# Patient Record
Sex: FEMALE | Race: OTHER | HISPANIC OR LATINO | URBAN - METROPOLITAN AREA
[De-identification: names, ages, dates, MRNs, and addresses within clinical notes are randomized per-mention and may not be internally consistent; named-entity substitution may affect disease eponyms.]

---

## 2021-11-02 ENCOUNTER — OFFICE (OUTPATIENT)
Dept: URBAN - METROPOLITAN AREA CLINIC 34 | Facility: CLINIC | Age: 61
End: 2021-11-02
Payer: MEDICAID

## 2021-11-02 VITALS
HEART RATE: 60 BPM | WEIGHT: 138 LBS | TEMPERATURE: 97.9 F | DIASTOLIC BLOOD PRESSURE: 82 MMHG | HEIGHT: 60 IN | SYSTOLIC BLOOD PRESSURE: 146 MMHG

## 2021-11-02 DIAGNOSIS — R14.0 ABDOMINAL DISTENSION (GASEOUS): ICD-10-CM

## 2021-11-02 DIAGNOSIS — Z12.11 ENCOUNTER FOR SCREENING FOR MALIGNANT NEOPLASM OF COLON: ICD-10-CM

## 2021-11-02 DIAGNOSIS — R10.13 EPIGASTRIC PAIN: ICD-10-CM

## 2021-11-02 DIAGNOSIS — R13.19 OTHER DYSPHAGIA: ICD-10-CM

## 2021-11-02 DIAGNOSIS — R12 HEARTBURN: ICD-10-CM

## 2021-11-02 PROCEDURE — 99204 OFFICE O/P NEW MOD 45 MIN: CPT

## 2021-11-02 RX ORDER — OMEPRAZOLE 40 MG/1
CAPSULE, DELAYED RELEASE ORAL
Qty: 30 | Refills: 0 | Status: ACTIVE

## 2021-11-02 NOTE — SERVICEHPINOTES
CHRIS ALANIZ   is a   61   year old    female who is being seen in consultation at the request of    for hiatal hernia. Former smoker (25 pack/years, quit 5 years ago) with emphysema. She had a chest CT in Sept for lung cancer screening which incidentally found a moderate hiatal hernia. br She has taken omeprazole 20mg daily for years but states acid reflux has gotten worse over the past couple months so increased to 40mg recently. Takes it after lunch. She reports dysphagia to solids jun if she eats too quickly. Epigastric pain and bloating at times. No n/v. No prior EGD.  Takes ASA occasionally, a couple times per week for HAs.
brQuit smoking 5 years ago, 25 pack/years.
br Aunt had colon cancer mother had lung cancer. No prior colonoscopy. 
br BMs can be every day or every couple days, no changes in bowels. No rectal bleeding or melena. No cardiac issues. br visited="true"

## 2021-11-15 ENCOUNTER — OFFICE (OUTPATIENT)
Dept: URBAN - METROPOLITAN AREA CLINIC 34 | Facility: CLINIC | Age: 61
End: 2021-11-15

## 2021-11-15 PROCEDURE — 00038: CPT | Performed by: INTERNAL MEDICINE

## 2021-12-03 ENCOUNTER — ON CAMPUS - OUTPATIENT (OUTPATIENT)
Dept: URBAN - METROPOLITAN AREA HOSPITAL 16 | Facility: HOSPITAL | Age: 61
End: 2021-12-03
Payer: MEDICAID

## 2021-12-03 DIAGNOSIS — R12 HEARTBURN: ICD-10-CM

## 2021-12-03 DIAGNOSIS — R10.13 EPIGASTRIC PAIN: ICD-10-CM

## 2021-12-03 DIAGNOSIS — K29.60 OTHER GASTRITIS WITHOUT BLEEDING: ICD-10-CM

## 2021-12-03 PROCEDURE — 43239 EGD BIOPSY SINGLE/MULTIPLE: CPT | Performed by: INTERNAL MEDICINE

## 2022-01-04 ENCOUNTER — ON CAMPUS - OUTPATIENT (OUTPATIENT)
Dept: URBAN - METROPOLITAN AREA HOSPITAL 16 | Facility: HOSPITAL | Age: 62
End: 2022-01-04
Payer: MEDICAID

## 2022-01-04 DIAGNOSIS — K63.5 POLYP OF COLON: ICD-10-CM

## 2022-01-04 DIAGNOSIS — Z12.11 ENCOUNTER FOR SCREENING FOR MALIGNANT NEOPLASM OF COLON: ICD-10-CM

## 2022-01-04 PROCEDURE — 45380 COLONOSCOPY AND BIOPSY: CPT | Mod: PT | Performed by: INTERNAL MEDICINE

## 2022-01-25 ENCOUNTER — OFFICE (OUTPATIENT)
Dept: URBAN - METROPOLITAN AREA CLINIC 34 | Facility: CLINIC | Age: 62
End: 2022-01-25
Payer: MEDICAID

## 2022-01-25 VITALS
DIASTOLIC BLOOD PRESSURE: 84 MMHG | HEIGHT: 60 IN | TEMPERATURE: 98.2 F | HEART RATE: 70 BPM | WEIGHT: 124 LBS | SYSTOLIC BLOOD PRESSURE: 133 MMHG

## 2022-01-25 DIAGNOSIS — R10.30 LOWER ABDOMINAL PAIN, UNSPECIFIED: ICD-10-CM

## 2022-01-25 DIAGNOSIS — R10.13 EPIGASTRIC PAIN: ICD-10-CM

## 2022-01-25 DIAGNOSIS — K21.00 GASTRO-ESOPHAGEAL REFLUX DISEASE WITH ESOPHAGITIS, WITHOUT B: ICD-10-CM

## 2022-01-25 DIAGNOSIS — K44.9 DIAPHRAGMATIC HERNIA WITHOUT OBSTRUCTION OR GANGRENE: ICD-10-CM

## 2022-01-25 DIAGNOSIS — R63.4 ABNORMAL WEIGHT LOSS: ICD-10-CM

## 2022-01-25 DIAGNOSIS — R14.0 ABDOMINAL DISTENSION (GASEOUS): ICD-10-CM

## 2022-01-25 PROCEDURE — 99214 OFFICE O/P EST MOD 30 MIN: CPT

## 2022-01-25 NOTE — SERVICEHPINOTES
CHRIS ALANIZ   is a   62  female who presents for follow up. 
br
br EGD 12/3/21 done for hiatal hernia, GERD, dysphagia, abd pain showed a 5cm hiatal hernia, mild gastritis neg for h pylori, otherwise unremarlable with normal esophagus.br
Screening colonoscopy 1/4/22 showed hyperplastic polyps, internal hemorrhoids. 
br
br She was previously on omeprazole 20mg daily for years but after last visit dose was increased to 40mg qAM. This is working well for her and states she is no longer having breakthrough acid reflux, epigastric pain, and states dysphagia has resolved.
br However, she reports frequent belching, bloating, and lower abd pain. Sx worsen after eating, bloating can start after a couple bites and reports early satiety due to bloating. No nausea, however. Has lost weight (~15lbs since Nov) due to this despite her trying to actually gain weight. Lower abd pain, slightly right of umbilicus. Worse after eating but has it today and has only had ensure this AM. Does not feel like dairy gives her issues. No acid regurgitation sx or nocturnal GERD. 
brShe stopped most of her medications--stopped NSAIDs (diclofenac), flexeril, and most other medications. br She has noticed certain foods worsen sx, specifically lasagna. br Has noticed bloating improves slightly after BMs but not by much. BMs are regular, every other day.
brChest CT w/o contrast done in Sept for lung cancer screening incidentally found a moderate hiatal hernia.br

## 2022-05-03 ENCOUNTER — OFFICE (OUTPATIENT)
Dept: URBAN - METROPOLITAN AREA CLINIC 34 | Facility: CLINIC | Age: 62
End: 2022-05-03
Payer: MEDICAID

## 2022-05-03 VITALS
SYSTOLIC BLOOD PRESSURE: 141 MMHG | WEIGHT: 122 LBS | TEMPERATURE: 95.9 F | DIASTOLIC BLOOD PRESSURE: 92 MMHG | HEIGHT: 60 IN | HEART RATE: 76 BPM

## 2022-05-03 DIAGNOSIS — R63.4 ABNORMAL WEIGHT LOSS: ICD-10-CM

## 2022-05-03 DIAGNOSIS — R14.0 ABDOMINAL DISTENSION (GASEOUS): ICD-10-CM

## 2022-05-03 DIAGNOSIS — K21.00 GASTRO-ESOPHAGEAL REFLUX DISEASE WITH ESOPHAGITIS, WITHOUT B: ICD-10-CM

## 2022-05-03 DIAGNOSIS — K44.9 DIAPHRAGMATIC HERNIA WITHOUT OBSTRUCTION OR GANGRENE: ICD-10-CM

## 2022-05-03 PROCEDURE — 99214 OFFICE O/P EST MOD 30 MIN: CPT

## 2022-05-03 NOTE — SERVICEHPINOTES
CHRIS ALANIZ   is a   62  female who presents for follow up. CT w/ contrast done 3/1 for abd pain and weight loss overall unremarkable--showed simple hepatic cyst in left lobe measuring 1.1cm and a few other tiny subcentimeter cysts in left lobe no concerning findings or cause of sx seen. 
br
br She reports persistent right sided abd pain, bloating, early satiety. Early satiety, not able to eat as much as she used to. Not eating the same. Symptoms started last year, around Sept. Also reports fatigue and low energy. She just had labs done w/ PCP last week, has follow up next month. 
br 
 Right lower abd pain, to the right of the umbilicus, worse after eating but states still gets pain on empty stomach/if she does not eat. Sharp intermittent pain, will knock the wind out of her for several seconds then goes away. br Has been on synthroid for a couple years, unsure when her last TSH was checked.br
She also gets dizzy spells. Has had cardiac workup for this last year. She doesn't sleep well in general, unrelated to abd pain/GI issues. BMs have bene regular, every other day--does not feel like symptoms improve or changes after BMs. brShe has "bad" glaucoma. 

br 
She has continued on omeprazole 40mg qAM, works better than 20mg dose did. Still getting a lot of belching. 
br No nausea or vomiting. No dysphagia (previously was an issue).
br She has lost weight since sx began last year--at first visit w/ us in Nov she was 138lb, today is 122lbs (was 124 in Jan).EGD 12/3/21 done for hiatal hernia, GERD, dysphagia, abd pain showed a 5cm hiatal hernia, mild gastritis neg for h pylori, otherwise unremarkable with normal esophagus.brScreening colonoscopy 1/4/22 showed hyperplastic polyps, internal hemorrhoids.  br
br Prior abd surgery only partial hysterectomy. Still has gallbladder.

## 2022-07-08 ENCOUNTER — OFFICE (OUTPATIENT)
Dept: URBAN - METROPOLITAN AREA CLINIC 34 | Facility: CLINIC | Age: 62
End: 2022-07-08
Payer: COMMERCIAL

## 2022-07-08 VITALS
HEIGHT: 60 IN | TEMPERATURE: 97.6 F | SYSTOLIC BLOOD PRESSURE: 137 MMHG | DIASTOLIC BLOOD PRESSURE: 87 MMHG | WEIGHT: 121 LBS | HEART RATE: 74 BPM

## 2022-07-08 DIAGNOSIS — R14.0 ABDOMINAL DISTENSION (GASEOUS): ICD-10-CM

## 2022-07-08 DIAGNOSIS — K44.9 DIAPHRAGMATIC HERNIA WITHOUT OBSTRUCTION OR GANGRENE: ICD-10-CM

## 2022-07-08 DIAGNOSIS — K21.00 GASTRO-ESOPHAGEAL REFLUX DISEASE WITH ESOPHAGITIS, WITHOUT B: ICD-10-CM

## 2022-07-08 PROCEDURE — 99214 OFFICE O/P EST MOD 30 MIN: CPT | Performed by: INTERNAL MEDICINE
